# Patient Record
Sex: FEMALE | Race: BLACK OR AFRICAN AMERICAN
[De-identification: names, ages, dates, MRNs, and addresses within clinical notes are randomized per-mention and may not be internally consistent; named-entity substitution may affect disease eponyms.]

---

## 2018-05-26 ENCOUNTER — HOSPITAL ENCOUNTER (EMERGENCY)
Dept: HOSPITAL 5 - ED | Age: 48
LOS: 1 days | Discharge: HOME | End: 2018-05-27
Payer: COMMERCIAL
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
## 2018-05-26 DIAGNOSIS — E11.9: ICD-10-CM

## 2018-05-26 DIAGNOSIS — S05.01XA: Primary | ICD-10-CM
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
## 2018-05-26 PROCEDURE — 99282 EMERGENCY DEPT VISIT SF MDM: CPT

## 2018-05-27 VITALS — SYSTOLIC BLOOD PRESSURE: 129 MMHG | DIASTOLIC BLOOD PRESSURE: 80 MMHG

## 2018-05-27 NOTE — EMERGENCY DEPARTMENT REPORT
ED Eye Problem HPI





- General


Chief complaint: Eye Problems


Stated complaint: RIGHT EYE INJURY


Time Seen by Provider: 05/26/18 22:52


Source: patient, family


Mode of arrival: Ambulatory


Limitations: No Limitations





- History of Present Illness


Initial comments: 





Patient here with her  and her report patient was worked today and was 

moving a box off a high shelf and it hit her in the right eye.  Patient 

complaint of right eye pain that is 9 out of 10.  Pain is sharp and she says 

she feels like she has something in her eye.  Reports eye watery and sensitive 

to light.  Tetanus vaccine is up-to-date.  No alleviating factor but pain is 

exacerbation when exposed to light and would blinking.  No medication taken 

prior to coming to the hospital.  This happened around 11 AM this morning.  

Patient has a history of diabetes.  Denies any other injury.  Denies loss of 

vision, denies any nausea or vomiting.  Denies any head injury headache.  

Denies any fall.


MD chief complaint: eye pain, eye redness, eye injury, foreign body (foreign 

body sensation), other (blurred vision and sensitivity to light)


-: This morning


Onset Description: sudden


Location: right eye


Place: work


If Injury: direct trauma


Eye Symptoms: redness, pain, foreign body sensation, blurry vision, photophobia


Severity: severe


Severity scale (0 -10): 9


If Pain, Quality: sharp


Consistency: constant


Context: trauma


Associated Symptoms: none


Treatments Prior to Arrival: none





- Related Data


Patient Tetanus UTD: Yes


 Previous Rx's











 Medication  Instructions  Recorded  Last Taken  Type


 


Gentamicin 0.3% Ophth Soln 2 drops OD Q6H 10 Days #1 bottle 05/27/18 Unknown Rx


 


HYDROcodone/APAP 7.5-325 [Norco 1 each PO Q6HR PRN #12 tablet 05/27/18 Unknown 

Rx





7.5/325]    


 


Ibuprofen [Motrin] 600 mg PO Q8H PRN #12 tablet 05/27/18 Unknown Rx











 Allergies











Allergy/AdvReac Type Severity Reaction Status Date / Time


 


No Known Allergies Allergy   Verified 05/27/18 00:06














ED Review of Systems


ROS: 


Stated complaint: RIGHT EYE INJURY


Other details as noted in HPI





Constitutional: denies: chills, fever


Eyes: eye pain, other (watery eye and sensitivity to light, positive foreign 

body sensation).  denies: eye discharge, vision change


ENT: denies: ear pain, throat pain, congestion


Respiratory: denies: cough, shortness of breath, SOB with exertion, wheezing


Cardiovascular: denies: chest pain, palpitations, edema, syncope


Gastrointestinal: denies: nausea, vomiting


Genitourinary: denies: discharge


Musculoskeletal: denies: back pain, joint swelling, arthralgia


Skin: denies: rash, lesions


Neurological: denies: headache, weakness, paresthesias, abnormal gait, vertigo





ED Past Medical Hx





- Past Medical History


Previous Medical History?: Yes


Hx Diabetes: Yes





- Surgical History


Past Surgical History?: No





- Family History


Family history: diabetes, hypertension





- Social History


Smoking Status: Never Smoker


Substance Use Type: None


Other Social History: 





Patient is  and lives with 





- Medications


Home Medications: 


 Home Medications











 Medication  Instructions  Recorded  Confirmed  Last Taken  Type


 


Gentamicin 0.3% Ophth Soln 2 drops OD Q6H 10 Days #1 bottle 05/27/18  Unknown Rx


 


HYDROcodone/APAP 7.5-325 [Norco 1 each PO Q6HR PRN #12 tablet 05/27/18  Unknown 

Rx





7.5/325]     


 


Ibuprofen [Motrin] 600 mg PO Q8H PRN #12 tablet 05/27/18  Unknown Rx














ED Physical Exam





- General


Limitations: No Limitations


General appearance: alert, in no apparent distress





- Head


Head exam: Present: atraumatic, normocephalic, normal inspection, other (normal 

exam)





- Eye


Eye exam: Present: PERRL, other (right sclera erythema).  Absent: normal 

appearance, scleral icterus, nystagmus, periorbital swelling, periorbital 

tenderness


Pupils: Present: normal accommodation





- Expanded Eye Exam


  ** Expanded


Eyelids: Normal Inspection: Right (bilateral)


Pupils: Regular, Round: Bilateral, Reactive: Bilateral


Sclera/Conjunctival: Normal Inspection: Left, Injection: Right (watery)


Anterior chamber: Normal Inspection: Bilateral


Posterior chamber: Normal Inspection: Bilateral


Visual acuity (R) = 20/: 100


Visual acuity (L) = 20/: 40 (20/40 both eyes)


With correction: No





- ENT


ENT exam: Present: normal exam, normal orophraynx, mucous membranes moist, TM's 

normal bilaterally, normal external ear exam, other (no maxillary or frontal 

sinus tenderness.  Nasal mucosa normal without any drainage)





- Neck


Neck exam: Present: normal inspection, full ROM, other (no C-spine tenderness).

  Absent: tenderness, meningismus, lymphadenopathy





- Respiratory


Respiratory exam: Present: normal lung sounds bilaterally.  Absent: respiratory 

distress, chest wall tenderness





- Cardiovascular


Cardiovascular Exam: Present: regular rate, normal rhythm, normal heart sounds.

  Absent: systolic murmur, diastolic murmur





- GI/Abdominal


GI/Abdominal exam: Present: soft, normal bowel sounds.  Absent: tenderness





- Extremities Exam


Extremities exam: Present: normal inspection, full ROM, normal capillary refill

, other (no clubbing, cyanosis or edema.The pulses to all extremities).  Absent

: tenderness, pedal edema, joint swelling, calf tenderness





- Back Exam


Back exam: Present: normal inspection, full ROM.  Absent: tenderness





- Neurological Exam


Neurological exam: Present: alert, oriented X3, normal gait





- Psychiatric


Psychiatric exam: Present: normal affect, normal mood





- Skin


Skin exam: Present: warm, dry, intact, normal color.  Absent: rash





ED Course


 Vital Signs











  05/26/18 05/27/18 05/27/18





  14:59 00:10 00:12


 


Temperature 98.2 F  


 


Pulse Rate 72  68


 


Respiratory 18 18 18





Rate   


 


Blood Pressure 145/88  


 


Blood Pressure   129/80





[Right]   


 


O2 Sat by Pulse 100  100





Oximetry   














- Reevaluation(s)


Reevaluation #1: 





05/27/18 00:46


Patient given Percocet 5/3-52 tablets by mouth in emergency for right eye pain 

with relief of pain.  Tetanus vaccine is up-to-date











- Procedure Description


Procedures done: Right eye procedure.  Patient with injury to her right eye 

with foreign body sensation and pain.  Tetracaine ophthalmic drops instilled 

and right eye.  2 drops, fluorescein staining and Wood positive fluoroscopy 

stain uptake.  Right eye was examined under Woods lamp and noted corneal 

abrasion.  Tetanus vaccine is up-to-date.  Right eyes flush with eyewash after 

procedure.  I instilled 2 drops of tetracaine for comfort and patient reports 

initially burned but then pain diminished.  Visual acuity done and please refer 

to nurse's and documentation for details.  Visual acuity is diminished to right 

eye due to injury.





ED Medical Decision Making





- Medical Decision Making





ED course





Diagnosis


1: Corneal abrasion right eye- Procedure Woods lamp testing. see procedure 

notes for details. TD UTD


-will  discharge with gentamicin ophthalmic and pain meds.


2: Right eye Pain-Pain relieved with tetracaine Ophthalmic and percocet 5/325 

mg 2 tablets po in ed





Educated on diagnosis, medication and follow-up. 


Instructed to practice good hand hygiene and to read discharge instruction and 

Corneal Abrasion. 


Educated on photophobia prevention in wearing dark glasses which pat already 

was wearing


Instructed if worsening symptoms return to ED


Visual Acuity stable , Decrease in RT eye refer to Nurses notes. Better after 

pain meds.





VSS, afeb upon discharge





Pt and her  voiced understanding of discharge instructions and 

understands that need to follow up with eye Doctor


Referral to PCP and Ophthalmologist





Patient discharged home with   in stable condition with prescription for 

gentamicin ophthalmic drops, Norco and Motrin.  She is in stable condition














- Differential Diagnosis


FB eye, Iritis , Corneal abrasion, conjunctivitis


Critical care attestation.: 


If time is entered above; I have spent that time in minutes in the direct care 

of this critically ill patient, excluding procedure time.








ED Disposition


Clinical Impression: 


 Pain, eye, right





Injury of conjunctiva and corneal abrasion of right eye w/o FB


Qualifiers:


 Encounter type: initial encounter Qualified Code(s): S05.01XA - Injury of 

conjunctiva and corneal abrasion without foreign body, right eye, initial 

encounter





Disposition: DC-01 TO HOME OR SELFCARE


Is pt being admited?: No


Does the pt Need Aspirin: No


Condition: Stable


Instructions:  Corneal Abrasion (ED), Eye Pain (ED)


Additional Instructions: 


Follow-up with Dr. sutherland ophthalmologists for follow-up corneal abrasion


Diffuse symptoms worsen, please return to the emergency room otherwise use 

antibiotic eyedrops as instructed


Norco for pain but please do not drive or operate heavy machinery while taking 

this medication.


Motrin for mild to moderate pain





Prescriptions: 


Gentamicin 0.3% Ophth Soln 2 drops OD Q6H 10 Days #1 bottle


HYDROcodone/APAP 7.5-325 [Norco 7.5/325] 1 each PO Q6HR PRN #12 tablet


 PRN Reason: severe pain


Ibuprofen [Motrin] 600 mg PO Q8H PRN #12 tablet


 PRN Reason: mild to moderate pain


Referrals: 


PRIMARY MD GERMÁN [Primary Care Provider] - 05/29/18


YAYO SUTHERLAND MD [Staff Physician] - 05/29/18


Forms:  Work/School Release Form(ED)